# Patient Record
(demographics unavailable — no encounter records)

---

## 2025-01-17 NOTE — DISCUSSION/SUMMARY
[FreeTextEntry1] : 86-year-old female with paroxysmal atrial fibrillation hypertension [id number] score 4 who is not suitable for long-term anticoagulation due to recurrent bleeding episodes hemarthrosis hemoptysis and not taking Eliquis at this time.  Patient benefits from alternative therapy endocardial LAAC device implant for stroke prevention. Discussed at great length i/r/b/a of Watchman implant including cardiac perforation risk <2%, cva risk, and the need for longterm doac ~4% all questions were answered. CHELSIE and general anesthesia during the procedure. Preprocedure CT heart.  -hold doac eliquis 2.5mg bid and [id number] to start post implant  -CHELSIE 2 months post implant for LAAC device position -plan to transition to SAPT [id number] 3-6months post implant depending on bleeding recurrence and ongoing risk  Patient expressed understanding of the discussion and recommendations. I spent a total of 45 minutes on the encounter evaluating and discussing treatment options with the patient, as well as counseling and coordination of care as stated above.  [EKG obtained to assist in diagnosis and management of assessed problem(s)] : EKG obtained to assist in diagnosis and management of assessed problem(s)

## 2025-01-17 NOTE — DISCUSSION/SUMMARY
[FreeTextEntry1] : 86-year-old female with paroxysmal atrial fibrillation hypertension [id number] score 4 who is not suitable for long-term anticoagulation due to recurrent bleeding episodes hemarthrosis hemoptysis and not taking Eliquis at this time.  Patient benefits from alternative therapy endocardial LAAC device implant for stroke prevention. Discussed at great length i/r/b/a of Watchman implant including cardiac perforation risk <2%, cva risk, and the need for longterm doac ~4% all questions were answered. CHELSIE and general anesthesia during the procedure. Preprocedure CT heart.  -hold doac eliquis 2.5mg bid and [id number] to start post implant  -CHELSIE 2 months post implant for LAAC device position -plan to transition to SAPT [id number] 3-6months post implant depending on bleeding recurrence and ongoing risk  Patient expressed understanding of the discussion and recommendations. I spent a total of 45 minutes on the encounter evaluating and discussing treatment options with the patient, as well as counseling and coordination of care as stated above.  [EKG obtained to assist in diagnosis and management of assessed problem(s)] : EKG obtained to assist in diagnosis and management of assessed problem(s)

## 2025-01-17 NOTE — HISTORY OF PRESENT ILLNESS
[FreeTextEntry1] : 85yo F with hx of htn, diverticulitis, dyslipidemia, paf first diagnosed in 2/2024 minimal symptoms, denies palpitations cp, cob  off Eliquis since end of November 2024 d/t recurrent hematomas, hemarthrosis both shoulders due to minimal trauma? episode of hemoptysis. She is on ASA 81mg daily. Patient is referred from Dr. Dunn's office for evaluation for LAAC watchman implant. TTE from 4/2024 shows normal biventricular size and function LVEF 55% normal biatrial size no significant valve abnormality. Patient denies CAD, dm, cva. chadsvasc 4 (age-2, htn, sex) ecg shows SR frequent APCs Has CT of heart schedule for this coming Tuesday 1/21